# Patient Record
Sex: MALE | Race: WHITE | NOT HISPANIC OR LATINO | Employment: OTHER | URBAN - NONMETROPOLITAN AREA
[De-identification: names, ages, dates, MRNs, and addresses within clinical notes are randomized per-mention and may not be internally consistent; named-entity substitution may affect disease eponyms.]

---

## 2019-11-16 ENCOUNTER — HOSPITAL ENCOUNTER (EMERGENCY)
Facility: HOSPITAL | Age: 79
Discharge: HOME | End: 2019-11-16
Attending: EMERGENCY MEDICINE | Admitting: EMERGENCY MEDICINE

## 2019-11-16 ENCOUNTER — APPOINTMENT (OUTPATIENT)
Dept: GENERAL RADIOLOGY | Facility: HOSPITAL | Age: 79
End: 2019-11-16

## 2019-11-16 VITALS
HEIGHT: 72 IN | HEART RATE: 88 BPM | RESPIRATION RATE: 24 BRPM | OXYGEN SATURATION: 95 % | DIASTOLIC BLOOD PRESSURE: 110 MMHG | WEIGHT: 192 LBS | BODY MASS INDEX: 26.01 KG/M2 | SYSTOLIC BLOOD PRESSURE: 178 MMHG | TEMPERATURE: 98 F

## 2019-11-16 DIAGNOSIS — I10 ESSENTIAL HYPERTENSION: ICD-10-CM

## 2019-11-16 DIAGNOSIS — S80.11XA CONTUSION OF MULTIPLE SITES OF RIGHT LOWER EXTREMITY, INITIAL ENCOUNTER: Primary | ICD-10-CM

## 2019-11-16 DIAGNOSIS — I69.359 CVA, OLD, HEMIPARESIS (HCC): ICD-10-CM

## 2019-11-16 PROCEDURE — 99284 EMERGENCY DEPT VISIT MOD MDM: CPT

## 2019-11-16 PROCEDURE — 73552 X-RAY EXAM OF FEMUR 2/>: CPT

## 2019-11-16 PROCEDURE — 73590 X-RAY EXAM OF LOWER LEG: CPT

## 2019-11-16 PROCEDURE — 72170 X-RAY EXAM OF PELVIS: CPT

## 2019-11-16 RX ORDER — CLONIDINE HYDROCHLORIDE 0.1 MG/1
0.2 TABLET ORAL ONCE
Status: COMPLETED | OUTPATIENT
Start: 2019-11-16 | End: 2019-11-16

## 2019-11-16 RX ORDER — HYDROCODONE BITARTRATE AND ACETAMINOPHEN 5; 325 MG/1; MG/1
1 TABLET ORAL EVERY 6 HOURS PRN
Qty: 10 TABLET | Refills: 0 | Status: SHIPPED | OUTPATIENT
Start: 2019-11-16

## 2019-11-16 RX ADMIN — CLONIDINE HYDROCHLORIDE 0.2 MG: 0.1 TABLET ORAL at 10:09

## 2019-11-16 NOTE — ED PROVIDER NOTES
Subjective   Patient says he was trying to move out of his wheelchair yesterday and slipped and fell and hurt his right leg.  Kind of folded up underneath him.  He has had a stroke in the past and is unable to use his right side.  The pain is continued today more than he thinks that should have compared to previous falls so he wanted to get checked out.        History provided by:  Patient   used: No    Fall   Mechanism of injury: fall    Injury location:  Leg  Leg injury location:  R upper leg and R lower leg  Incident location:  Home  Time since incident:  1 day  Arrived directly from scene: no    Fall:     Fall occurred:  From a stool    Impact surface:  Hard floor    Point of impact:  Unable to specify    Entrapped after fall: no    Protective equipment: none    Suspicion of alcohol use: no    Suspicion of drug use: no    Tetanus status:  Unknown  Prior to arrival data:     Bystander interventions:  None    Patient ambulatory at scene: no      Blood loss:  None    Responsiveness at scene:  Alert    Orientation at scene:  Person, place, situation and time    Loss of consciousness: no      Amnesic to event: no      Airway interventions:  None    Breathing interventions:  None    IV access status:  None    IO access:  None    Fluids administered:  None    Cardiac interventions:  None    Medications administered:  None    Immobilization:  None    Airway condition since incident:  Stable    Breathing condition since incident:  Stable    Circulation condition since incident:  Stable    Mental status condition since incident:  Stable    Disability condition since incident:  Stable  Associated symptoms: no abdominal pain, no back pain, no blindness, no difficulty breathing, no hearing loss, no nausea and no seizures    Risk factors: no AICD, no anticoagulation therapy, no asthma, no CABG, no dialysis, no hemophilia, no kidney disease, no pacemaker, no past MI and not pregnant        Review of Systems    Constitutional: Negative.    HENT: Negative.  Negative for hearing loss.    Eyes: Negative for blindness.   Respiratory: Negative.    Cardiovascular: Negative.    Gastrointestinal: Negative.  Negative for abdominal pain and nausea.   Genitourinary: Negative.    Musculoskeletal: Negative.  Negative for back pain.   Neurological: Negative.  Negative for seizures.   Psychiatric/Behavioral: Negative.    All other systems reviewed and are negative.      No past medical history on file.    No Known Allergies    No past surgical history on file.    No family history on file.    Social History     Socioeconomic History   • Marital status: Single     Spouse name: Not on file   • Number of children: Not on file   • Years of education: Not on file   • Highest education level: Not on file       Prior to Admission medications    Not on File       Medications   cloNIDine (CATAPRES) tablet 0.2 mg (0.2 mg Oral Given 11/16/19 1009)       Vitals:    11/16/19 1009   BP:    Pulse: 79   Resp:    Temp:    SpO2:          Objective   Physical Exam   Constitutional: He is oriented to person, place, and time. He appears well-developed and well-nourished.   HENT:   Head: Normocephalic and atraumatic.   Neck: Normal range of motion. Neck supple.   Musculoskeletal:   Right arm is contracted and non-usable from a previous stroke.  He does not have any use in his right leg but does have some tenderness to palpation in the right mid thigh area and in the right anterior shin area.  There is no bony deformity noted.  There is no obvious limitation in terms of range of motion passively.   Neurological: He is alert and oriented to person, place, and time.   Skin: Skin is warm and dry.   Psychiatric: He has a normal mood and affect. His behavior is normal.   Nursing note and vitals reviewed.      Procedures         Lab Results (last 24 hours)     ** No results found for the last 24 hours. **          XR Femur 2 View Right   Final Result   No acute  bony abnormality.       This report was finalized on 11/16/2019 10:05 by Dr. Adis Escudero MD.      XR Pelvis 1 or 2 View   Final Result   1. No evidence of acute pelvic fracture.   2. Over coverage of the femoral heads by the acetabulum. Bony prominence   at the femoral head/neck junction. These findings can be seen in   patients with femoroacetabular impingement anatomy.           This report was finalized on 11/16/2019 10:04 by Dr. Adis Escudero MD.      XR Tibia Fibula 2 View Right   Final Result   No acute bony abnormality.       This report was finalized on 11/16/2019 10:03 by Dr. Adis Escudero MD.          ED Course  ED Course as of Nov 16 1041   Sat Nov 16, 2019   1037 I told the patient that his x-ray okay with no signs of anything broken.  He admits to not taking his blood pressure medication but says he will do so in the future.  He has family asked about some help getting him some home health or other rehab.  His stroke has been long-standing and has had rehab for that in the past but now he cannot name but well because the soreness in his leg.  I have told her that he has to get into a regular family physician and they can be arranged through PATS transfers in order to get to them to get all the other treatments going.  [TR]      ED Course User Index  [TR] Fausto Ness Jr., MD          MDM  Number of Diagnoses or Management Options  Contusion of multiple sites of right lower extremity, initial encounter: new and requires workup  CVA, old, hemiparesis (CMS/HCC): established and worsening  Essential hypertension: established and worsening     Amount and/or Complexity of Data Reviewed  Tests in the radiology section of CPT®: ordered and reviewed    Risk of Complications, Morbidity, and/or Mortality  Presenting problems: moderate  Diagnostic procedures: moderate  Management options: moderate    Patient Progress  Patient progress: stable      Final diagnoses:   Contusion of multiple sites of right  lower extremity, initial encounter   Essential hypertension   CVA, old, hemiparesis (CMS/HCC)          Fausto Ness Jr., MD  11/16/19 1941

## 2019-11-19 ENCOUNTER — OFFICE VISIT (OUTPATIENT)
Dept: INTERNAL MEDICINE | Age: 79
End: 2019-11-19
Payer: MEDICARE

## 2019-11-19 ENCOUNTER — APPOINTMENT (OUTPATIENT)
Dept: CT IMAGING | Age: 79
DRG: 064 | End: 2019-11-19
Payer: MEDICARE

## 2019-11-19 ENCOUNTER — APPOINTMENT (OUTPATIENT)
Dept: GENERAL RADIOLOGY | Age: 79
DRG: 064 | End: 2019-11-19
Payer: MEDICARE

## 2019-11-19 ENCOUNTER — HOSPITAL ENCOUNTER (INPATIENT)
Age: 79
LOS: 3 days | Discharge: NURSING, REHAB OR LONG-TERM CARE | DRG: 064 | End: 2019-11-22
Attending: EMERGENCY MEDICINE | Admitting: INTERNAL MEDICINE
Payer: MEDICARE

## 2019-11-19 VITALS — DIASTOLIC BLOOD PRESSURE: 130 MMHG | OXYGEN SATURATION: 98 % | HEART RATE: 64 BPM | SYSTOLIC BLOOD PRESSURE: 222 MMHG

## 2019-11-19 DIAGNOSIS — Z86.73 HISTORY OF CVA (CEREBROVASCULAR ACCIDENT): ICD-10-CM

## 2019-11-19 DIAGNOSIS — S72.001A CLOSED FRACTURE OF NECK OF RIGHT FEMUR, INITIAL ENCOUNTER (HCC): ICD-10-CM

## 2019-11-19 DIAGNOSIS — I10 ESSENTIAL HYPERTENSION: ICD-10-CM

## 2019-11-19 DIAGNOSIS — I63.9 CEREBROVASCULAR ACCIDENT (CVA), UNSPECIFIED MECHANISM (HCC): ICD-10-CM

## 2019-11-19 DIAGNOSIS — S72.001A CLOSED RIGHT HIP FRACTURE, INITIAL ENCOUNTER (HCC): ICD-10-CM

## 2019-11-19 DIAGNOSIS — I16.1 HYPERTENSIVE EMERGENCY: ICD-10-CM

## 2019-11-19 DIAGNOSIS — I62.9 INTRACRANIAL BLEED (HCC): Primary | ICD-10-CM

## 2019-11-19 DIAGNOSIS — I48.91 ATRIAL FIBRILLATION, UNSPECIFIED TYPE (HCC): ICD-10-CM

## 2019-11-19 DIAGNOSIS — Z91.14 NON COMPLIANCE W MEDICATION REGIMEN: ICD-10-CM
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
## 2019-11-19 PROBLEM — R47.1 DYSARTHRIA: Status: ACTIVE | Noted: 2019-11-19

## 2019-11-19 PROBLEM — I49.9 ARRHYTHMIA: Status: ACTIVE | Noted: 2019-11-19

## 2019-11-19 LAB
ALBUMIN SERPL-MCNC: 4 G/DL (ref 3.5–5.2)
ALP BLD-CCNC: 105 U/L (ref 40–130)
ALT SERPL-CCNC: 25 U/L (ref 5–41)
ANION GAP SERPL CALCULATED.3IONS-SCNC: 14 MMOL/L (ref 7–19)
APTT: 31.2 SEC (ref 26–36.2)
AST SERPL-CCNC: 24 U/L (ref 5–40)
BASOPHILS ABSOLUTE: 0 K/UL (ref 0–0.2)
BASOPHILS RELATIVE PERCENT: 0.6 % (ref 0–1)
BILIRUB SERPL-MCNC: 0.6 MG/DL (ref 0.2–1.2)
BUN BLDV-MCNC: 11 MG/DL (ref 8–23)
CALCIUM SERPL-MCNC: 9.4 MG/DL (ref 8.8–10.2)
CHLORIDE BLD-SCNC: 99 MMOL/L (ref 98–111)
CO2: 21 MMOL/L (ref 22–29)
CREAT SERPL-MCNC: 0.7 MG/DL (ref 0.5–1.2)
D DIMER: 3.73 UG/ML FEU (ref 0–0.48)
EOSINOPHILS ABSOLUTE: 0.1 K/UL (ref 0–0.6)
EOSINOPHILS RELATIVE PERCENT: 1.7 % (ref 0–5)
GFR NON-AFRICAN AMERICAN: >60
GLUCOSE BLD-MCNC: 102 MG/DL (ref 74–109)
HCT VFR BLD CALC: 46.8 % (ref 42–52)
HEMOGLOBIN: 15.6 G/DL (ref 14–18)
IMMATURE GRANULOCYTES #: 0 K/UL
INR BLD: 1.05 (ref 0.88–1.18)
LIPASE: 29 U/L (ref 13–60)
LYMPHOCYTES ABSOLUTE: 1.7 K/UL (ref 1.1–4.5)
LYMPHOCYTES RELATIVE PERCENT: 24 % (ref 20–40)
MCH RBC QN AUTO: 31.1 PG (ref 27–31)
MCHC RBC AUTO-ENTMCNC: 33.3 G/DL (ref 33–37)
MCV RBC AUTO: 93.4 FL (ref 80–94)
MONOCYTES ABSOLUTE: 0.6 K/UL (ref 0–0.9)
MONOCYTES RELATIVE PERCENT: 8.3 % (ref 0–10)
NEUTROPHILS ABSOLUTE: 4.5 K/UL (ref 1.5–7.5)
NEUTROPHILS RELATIVE PERCENT: 65.1 % (ref 50–65)
PDW BLD-RTO: 13.3 % (ref 11.5–14.5)
PLATELET # BLD: 207 K/UL (ref 130–400)
PMV BLD AUTO: 9.8 FL (ref 9.4–12.4)
POTASSIUM REFLEX MAGNESIUM: 4.2 MMOL/L (ref 3.5–5)
PRO-BNP: 3651 PG/ML (ref 0–1800)
PROTHROMBIN TIME: 13.1 SEC (ref 12–14.6)
RBC # BLD: 5.01 M/UL (ref 4.7–6.1)
SODIUM BLD-SCNC: 134 MMOL/L (ref 136–145)
TOTAL PROTEIN: 7.8 G/DL (ref 6.6–8.7)
TROPONIN: <0.01 NG/ML (ref 0–0.03)
WBC # BLD: 6.9 K/UL (ref 4.8–10.8)

## 2019-11-19 PROCEDURE — 70450 CT HEAD/BRAIN W/O DYE: CPT

## 2019-11-19 PROCEDURE — 2500000003 HC RX 250 WO HCPCS: Performed by: INTERNAL MEDICINE

## 2019-11-19 PROCEDURE — 93005 ELECTROCARDIOGRAM TRACING: CPT | Performed by: NURSE PRACTITIONER

## 2019-11-19 PROCEDURE — 83880 ASSAY OF NATRIURETIC PEPTIDE: CPT

## 2019-11-19 PROCEDURE — 85025 COMPLETE CBC W/AUTO DIFF WBC: CPT

## 2019-11-19 PROCEDURE — 2580000003 HC RX 258: Performed by: INTERNAL MEDICINE

## 2019-11-19 PROCEDURE — 4040F PNEUMOC VAC/ADMIN/RCVD: CPT | Performed by: INTERNAL MEDICINE

## 2019-11-19 PROCEDURE — 87081 CULTURE SCREEN ONLY: CPT

## 2019-11-19 PROCEDURE — 83690 ASSAY OF LIPASE: CPT

## 2019-11-19 PROCEDURE — 71275 CT ANGIOGRAPHY CHEST: CPT

## 2019-11-19 PROCEDURE — 6360000004 HC RX CONTRAST MEDICATION: Performed by: EMERGENCY MEDICINE

## 2019-11-19 PROCEDURE — 99285 EMERGENCY DEPT VISIT HI MDM: CPT

## 2019-11-19 PROCEDURE — 85379 FIBRIN DEGRADATION QUANT: CPT

## 2019-11-19 PROCEDURE — G8599 NO ASA/ANTIPLAT THER USE RNG: HCPCS | Performed by: INTERNAL MEDICINE

## 2019-11-19 PROCEDURE — 2000000000 HC ICU R&B

## 2019-11-19 PROCEDURE — 36415 COLL VENOUS BLD VENIPUNCTURE: CPT

## 2019-11-19 PROCEDURE — 1123F ACP DISCUSS/DSCN MKR DOCD: CPT | Performed by: INTERNAL MEDICINE

## 2019-11-19 PROCEDURE — 2580000003 HC RX 258: Performed by: NURSE PRACTITIONER

## 2019-11-19 PROCEDURE — 2500000003 HC RX 250 WO HCPCS: Performed by: NURSE PRACTITIONER

## 2019-11-19 PROCEDURE — G8421 BMI NOT CALCULATED: HCPCS | Performed by: INTERNAL MEDICINE

## 2019-11-19 PROCEDURE — 73700 CT LOWER EXTREMITY W/O DYE: CPT

## 2019-11-19 PROCEDURE — 99203 OFFICE O/P NEW LOW 30 MIN: CPT | Performed by: INTERNAL MEDICINE

## 2019-11-19 PROCEDURE — 93005 ELECTROCARDIOGRAM TRACING: CPT | Performed by: INTERNAL MEDICINE

## 2019-11-19 PROCEDURE — G8484 FLU IMMUNIZE NO ADMIN: HCPCS | Performed by: INTERNAL MEDICINE

## 2019-11-19 PROCEDURE — 73502 X-RAY EXAM HIP UNI 2-3 VIEWS: CPT

## 2019-11-19 PROCEDURE — 85610 PROTHROMBIN TIME: CPT

## 2019-11-19 PROCEDURE — 85730 THROMBOPLASTIN TIME PARTIAL: CPT

## 2019-11-19 PROCEDURE — G8427 DOCREV CUR MEDS BY ELIG CLIN: HCPCS | Performed by: INTERNAL MEDICINE

## 2019-11-19 PROCEDURE — 84484 ASSAY OF TROPONIN QUANT: CPT

## 2019-11-19 PROCEDURE — 1036F TOBACCO NON-USER: CPT | Performed by: INTERNAL MEDICINE

## 2019-11-19 PROCEDURE — 71045 X-RAY EXAM CHEST 1 VIEW: CPT

## 2019-11-19 PROCEDURE — 80053 COMPREHEN METABOLIC PANEL: CPT

## 2019-11-19 RX ORDER — SODIUM CHLORIDE 0.9 % (FLUSH) 0.9 %
10 SYRINGE (ML) INJECTION EVERY 12 HOURS SCHEDULED
Status: DISCONTINUED | OUTPATIENT
Start: 2019-11-19 | End: 2019-11-22 | Stop reason: HOSPADM

## 2019-11-19 RX ORDER — SODIUM CHLORIDE 9 MG/ML
25 INJECTION, SOLUTION INTRAVENOUS PRN
Status: DISCONTINUED | OUTPATIENT
Start: 2019-11-19 | End: 2019-11-22 | Stop reason: HOSPADM

## 2019-11-19 RX ORDER — TRAMADOL HYDROCHLORIDE 50 MG/1
50 TABLET ORAL EVERY 6 HOURS PRN
Status: DISCONTINUED | OUTPATIENT
Start: 2019-11-19 | End: 2019-11-22 | Stop reason: HOSPADM

## 2019-11-19 RX ORDER — SODIUM CHLORIDE 0.9 % (FLUSH) 0.9 %
10 SYRINGE (ML) INJECTION PRN
Status: DISCONTINUED | OUTPATIENT
Start: 2019-11-19 | End: 2019-11-22 | Stop reason: HOSPADM

## 2019-11-19 RX ORDER — ONDANSETRON 2 MG/ML
4 INJECTION INTRAMUSCULAR; INTRAVENOUS EVERY 6 HOURS PRN
Status: DISCONTINUED | OUTPATIENT
Start: 2019-11-19 | End: 2019-11-22 | Stop reason: HOSPADM

## 2019-11-19 RX ADMIN — IOPAMIDOL 90 ML: 755 INJECTION, SOLUTION INTRAVENOUS at 19:12

## 2019-11-19 RX ADMIN — DEXTROSE MONOHYDRATE 2 MG/HR: 50 INJECTION, SOLUTION INTRAVENOUS at 23:18

## 2019-11-19 RX ADMIN — DEXTROSE MONOHYDRATE 5 MG/HR: 50 INJECTION, SOLUTION INTRAVENOUS at 17:53

## 2019-11-19 RX ADMIN — Medication 10 ML: at 20:53

## 2019-11-19 ASSESSMENT — PATIENT HEALTH QUESTIONNAIRE - PHQ9
1. LITTLE INTEREST OR PLEASURE IN DOING THINGS: 0
SUM OF ALL RESPONSES TO PHQ QUESTIONS 1-9: 0
SUM OF ALL RESPONSES TO PHQ QUESTIONS 1-9: 0
SUM OF ALL RESPONSES TO PHQ9 QUESTIONS 1 & 2: 0
2. FEELING DOWN, DEPRESSED OR HOPELESS: 0

## 2019-11-19 ASSESSMENT — PAIN SCALES - GENERAL: PAINLEVEL_OUTOF10: 0

## 2019-11-20 ENCOUNTER — APPOINTMENT (OUTPATIENT)
Dept: CT IMAGING | Age: 79
DRG: 064 | End: 2019-11-20
Payer: MEDICARE

## 2019-11-20 PROBLEM — Z51.5 PALLIATIVE CARE PATIENT: Status: ACTIVE | Noted: 2019-11-20

## 2019-11-20 LAB
ANION GAP SERPL CALCULATED.3IONS-SCNC: 13 MMOL/L (ref 7–19)
BASOPHILS ABSOLUTE: 0.1 K/UL (ref 0–0.2)
BASOPHILS RELATIVE PERCENT: 0.8 % (ref 0–1)
BUN BLDV-MCNC: 10 MG/DL (ref 8–23)
CALCIUM SERPL-MCNC: 9.4 MG/DL (ref 8.8–10.2)
CHLORIDE BLD-SCNC: 99 MMOL/L (ref 98–111)
CO2: 24 MMOL/L (ref 22–29)
CREAT SERPL-MCNC: 0.8 MG/DL (ref 0.5–1.2)
EKG P AXIS: -45 DEGREES
EKG P AXIS: NORMAL DEGREES
EKG P-R INTERVAL: 204 MS
EKG P-R INTERVAL: NORMAL MS
EKG Q-T INTERVAL: 360 MS
EKG Q-T INTERVAL: 376 MS
EKG QRS DURATION: 110 MS
EKG QRS DURATION: 114 MS
EKG QTC CALCULATION (BAZETT): 417 MS
EKG QTC CALCULATION (BAZETT): 428 MS
EKG T AXIS: -122 DEGREES
EKG T AXIS: -85 DEGREES
EOSINOPHILS ABSOLUTE: 0.1 K/UL (ref 0–0.6)
EOSINOPHILS RELATIVE PERCENT: 1.4 % (ref 0–5)
GFR NON-AFRICAN AMERICAN: >60
GLUCOSE BLD-MCNC: 116 MG/DL (ref 74–109)
HCT VFR BLD CALC: 46 % (ref 42–52)
HEMOGLOBIN: 15.3 G/DL (ref 14–18)
IMMATURE GRANULOCYTES #: 0 K/UL
LYMPHOCYTES ABSOLUTE: 1.6 K/UL (ref 1.1–4.5)
LYMPHOCYTES RELATIVE PERCENT: 24.8 % (ref 20–40)
MCH RBC QN AUTO: 30.9 PG (ref 27–31)
MCHC RBC AUTO-ENTMCNC: 33.3 G/DL (ref 33–37)
MCV RBC AUTO: 92.9 FL (ref 80–94)
MONOCYTES ABSOLUTE: 0.6 K/UL (ref 0–0.9)
MONOCYTES RELATIVE PERCENT: 9.4 % (ref 0–10)
NEUTROPHILS ABSOLUTE: 4 K/UL (ref 1.5–7.5)
NEUTROPHILS RELATIVE PERCENT: 63.3 % (ref 50–65)
PDW BLD-RTO: 13.4 % (ref 11.5–14.5)
PLATELET # BLD: 205 K/UL (ref 130–400)
PMV BLD AUTO: 9.7 FL (ref 9.4–12.4)
POTASSIUM REFLEX MAGNESIUM: 3.7 MMOL/L (ref 3.5–5)
RBC # BLD: 4.95 M/UL (ref 4.7–6.1)
SODIUM BLD-SCNC: 136 MMOL/L (ref 136–145)
WBC # BLD: 6.3 K/UL (ref 4.8–10.8)

## 2019-11-20 PROCEDURE — 6370000000 HC RX 637 (ALT 250 FOR IP): Performed by: INTERNAL MEDICINE

## 2019-11-20 PROCEDURE — 93010 ELECTROCARDIOGRAM REPORT: CPT | Performed by: INTERNAL MEDICINE

## 2019-11-20 PROCEDURE — 2580000003 HC RX 258: Performed by: INTERNAL MEDICINE

## 2019-11-20 PROCEDURE — 1210000000 HC MED SURG R&B

## 2019-11-20 PROCEDURE — 6370000000 HC RX 637 (ALT 250 FOR IP): Performed by: HOSPITALIST

## 2019-11-20 PROCEDURE — 80048 BASIC METABOLIC PNL TOTAL CA: CPT

## 2019-11-20 PROCEDURE — 70450 CT HEAD/BRAIN W/O DYE: CPT

## 2019-11-20 PROCEDURE — 85025 COMPLETE CBC W/AUTO DIFF WBC: CPT

## 2019-11-20 PROCEDURE — 36415 COLL VENOUS BLD VENIPUNCTURE: CPT

## 2019-11-20 PROCEDURE — 99221 1ST HOSP IP/OBS SF/LOW 40: CPT | Performed by: NEUROLOGICAL SURGERY

## 2019-11-20 PROCEDURE — 92610 EVALUATE SWALLOWING FUNCTION: CPT

## 2019-11-20 RX ORDER — CARVEDILOL 3.12 MG/1
3.12 TABLET ORAL ONCE
Status: COMPLETED | OUTPATIENT
Start: 2019-11-20 | End: 2019-11-20

## 2019-11-20 RX ORDER — UREA 10 %
1 LOTION (ML) TOPICAL NIGHTLY PRN
Status: DISCONTINUED | OUTPATIENT
Start: 2019-11-20 | End: 2019-11-22 | Stop reason: HOSPADM

## 2019-11-20 RX ORDER — CLONIDINE HYDROCHLORIDE 0.1 MG/1
0.1 TABLET ORAL EVERY 4 HOURS PRN
Status: DISCONTINUED | OUTPATIENT
Start: 2019-11-20 | End: 2019-11-22 | Stop reason: HOSPADM

## 2019-11-20 RX ORDER — CARVEDILOL 6.25 MG/1
6.25 TABLET ORAL 2 TIMES DAILY WITH MEALS
Status: DISCONTINUED | OUTPATIENT
Start: 2019-11-20 | End: 2019-11-22 | Stop reason: HOSPADM

## 2019-11-20 RX ADMIN — CLONIDINE HYDROCHLORIDE 0.1 MG: 0.1 TABLET ORAL at 18:02

## 2019-11-20 RX ADMIN — TRAMADOL HYDROCHLORIDE 50 MG: 50 TABLET, FILM COATED ORAL at 01:07

## 2019-11-20 RX ADMIN — CARVEDILOL 6.25 MG: 6.25 TABLET, FILM COATED ORAL at 16:28

## 2019-11-20 RX ADMIN — Medication 1 MG: at 23:14

## 2019-11-20 RX ADMIN — CARVEDILOL 3.12 MG: 3.12 TABLET, FILM COATED ORAL at 13:08

## 2019-11-20 RX ADMIN — CLONIDINE HYDROCHLORIDE 0.1 MG: 0.1 TABLET ORAL at 23:26

## 2019-11-20 RX ADMIN — Medication 10 ML: at 10:08

## 2019-11-20 RX ADMIN — TRAMADOL HYDROCHLORIDE 50 MG: 50 TABLET, FILM COATED ORAL at 23:26

## 2019-11-20 RX ADMIN — CARVEDILOL 6.25 MG: 6.25 TABLET, FILM COATED ORAL at 10:44

## 2019-11-20 ASSESSMENT — PAIN DESCRIPTION - LOCATION: LOCATION: HIP

## 2019-11-20 ASSESSMENT — PAIN SCALES - GENERAL
PAINLEVEL_OUTOF10: 5
PAINLEVEL_OUTOF10: 10
PAINLEVEL_OUTOF10: 0
PAINLEVEL_OUTOF10: 5
PAINLEVEL_OUTOF10: 0

## 2019-11-20 ASSESSMENT — PAIN DESCRIPTION - ORIENTATION: ORIENTATION: RIGHT

## 2019-11-20 ASSESSMENT — PAIN DESCRIPTION - PAIN TYPE: TYPE: ACUTE PAIN

## 2019-11-20 ASSESSMENT — ENCOUNTER SYMPTOMS
EYES NEGATIVE: 1
RESPIRATORY NEGATIVE: 1
GASTROINTESTINAL NEGATIVE: 1

## 2019-11-20 ASSESSMENT — PAIN DESCRIPTION - ONSET: ONSET: GRADUAL

## 2019-11-20 ASSESSMENT — PAIN DESCRIPTION - DESCRIPTORS: DESCRIPTORS: SHARP;ACHING

## 2019-11-20 ASSESSMENT — PAIN DESCRIPTION - FREQUENCY: FREQUENCY: CONTINUOUS

## 2019-11-21 LAB — MRSA CULTURE ONLY: NORMAL

## 2019-11-21 PROCEDURE — 2580000003 HC RX 258: Performed by: INTERNAL MEDICINE

## 2019-11-21 PROCEDURE — 99232 SBSQ HOSP IP/OBS MODERATE 35: CPT | Performed by: NEUROLOGICAL SURGERY

## 2019-11-21 PROCEDURE — 6370000000 HC RX 637 (ALT 250 FOR IP): Performed by: HOSPITALIST

## 2019-11-21 PROCEDURE — 97530 THERAPEUTIC ACTIVITIES: CPT

## 2019-11-21 PROCEDURE — 1210000000 HC MED SURG R&B

## 2019-11-21 PROCEDURE — 97162 PT EVAL MOD COMPLEX 30 MIN: CPT

## 2019-11-21 RX ADMIN — CLONIDINE HYDROCHLORIDE 0.1 MG: 0.1 TABLET ORAL at 14:44

## 2019-11-21 RX ADMIN — CARVEDILOL 6.25 MG: 6.25 TABLET, FILM COATED ORAL at 16:45

## 2019-11-21 RX ADMIN — Medication 10 ML: at 09:34

## 2019-11-21 RX ADMIN — CARVEDILOL 6.25 MG: 6.25 TABLET, FILM COATED ORAL at 09:32

## 2019-11-21 ASSESSMENT — PAIN DESCRIPTION - LOCATION: LOCATION: LEG

## 2019-11-21 ASSESSMENT — PAIN SCALES - GENERAL: PAINLEVEL_OUTOF10: 2

## 2019-11-21 ASSESSMENT — PAIN DESCRIPTION - ORIENTATION: ORIENTATION: RIGHT

## 2019-11-21 ASSESSMENT — PAIN DESCRIPTION - FREQUENCY: FREQUENCY: INTERMITTENT

## 2019-11-21 ASSESSMENT — PAIN DESCRIPTION - DESCRIPTORS: DESCRIPTORS: ACHING

## 2019-11-21 ASSESSMENT — PAIN DESCRIPTION - PROGRESSION: CLINICAL_PROGRESSION: NOT CHANGED

## 2019-11-21 ASSESSMENT — PAIN DESCRIPTION - PAIN TYPE: TYPE: ACUTE PAIN

## 2019-11-21 ASSESSMENT — PAIN DESCRIPTION - ONSET: ONSET: GRADUAL

## 2019-11-22 VITALS
RESPIRATION RATE: 16 BRPM | HEART RATE: 62 BPM | HEIGHT: 73 IN | WEIGHT: 179.6 LBS | BODY MASS INDEX: 23.8 KG/M2 | TEMPERATURE: 98 F | SYSTOLIC BLOOD PRESSURE: 141 MMHG | OXYGEN SATURATION: 95 % | DIASTOLIC BLOOD PRESSURE: 83 MMHG

## 2019-11-22 PROCEDURE — 6370000000 HC RX 637 (ALT 250 FOR IP): Performed by: HOSPITALIST

## 2019-11-22 PROCEDURE — 92526 ORAL FUNCTION THERAPY: CPT

## 2019-11-22 PROCEDURE — 2580000003 HC RX 258: Performed by: INTERNAL MEDICINE

## 2019-11-22 RX ORDER — CARVEDILOL 6.25 MG/1
6.25 TABLET ORAL 2 TIMES DAILY WITH MEALS
Qty: 60 TABLET | Refills: 0
Start: 2019-11-22 | End: 2020-01-15

## 2019-11-22 RX ORDER — CLONIDINE HYDROCHLORIDE 0.1 MG/1
0.1 TABLET ORAL EVERY 4 HOURS PRN
Qty: 60 TABLET | Refills: 0
Start: 2019-11-22 | End: 2020-08-24 | Stop reason: CLARIF

## 2019-11-22 RX ORDER — TRAMADOL HYDROCHLORIDE 50 MG/1
50 TABLET ORAL EVERY 6 HOURS PRN
Qty: 10 TABLET | Refills: 0 | Status: SHIPPED | OUTPATIENT
Start: 2019-11-22 | End: 2019-11-25

## 2019-11-22 RX ADMIN — CLONIDINE HYDROCHLORIDE 0.1 MG: 0.1 TABLET ORAL at 00:13

## 2019-11-22 RX ADMIN — Medication 10 ML: at 09:30

## 2019-11-22 RX ADMIN — CARVEDILOL 6.25 MG: 6.25 TABLET, FILM COATED ORAL at 09:30

## 2019-11-22 ASSESSMENT — PAIN SCALES - GENERAL
PAINLEVEL_OUTOF10: 0
PAINLEVEL_OUTOF10: 2

## 2019-11-22 ASSESSMENT — PAIN DESCRIPTION - ORIENTATION: ORIENTATION: RIGHT

## 2019-11-22 ASSESSMENT — PAIN DESCRIPTION - DESCRIPTORS: DESCRIPTORS: ACHING

## 2019-11-22 ASSESSMENT — PAIN DESCRIPTION - PAIN TYPE
TYPE: ACUTE PAIN
TYPE: ACUTE PAIN

## 2019-11-22 ASSESSMENT — PAIN DESCRIPTION - FREQUENCY: FREQUENCY: INTERMITTENT

## 2019-11-22 ASSESSMENT — PAIN DESCRIPTION - ONSET: ONSET: GRADUAL

## 2019-11-22 ASSESSMENT — PAIN DESCRIPTION - LOCATION: LOCATION: LEG

## 2019-11-22 ASSESSMENT — PAIN DESCRIPTION - PROGRESSION: CLINICAL_PROGRESSION: NOT CHANGED

## 2019-11-25 ENCOUNTER — TELEPHONE (OUTPATIENT)
Dept: INTERNAL MEDICINE | Age: 79
End: 2019-11-25

## 2019-11-26 ENCOUNTER — LAB REQUISITION (OUTPATIENT)
Dept: LAB | Facility: HOSPITAL | Age: 79
End: 2019-11-26

## 2019-11-26 DIAGNOSIS — Z00.00 ENCOUNTER FOR GENERAL ADULT MEDICAL EXAMINATION WITHOUT ABNORMAL FINDINGS: ICD-10-CM

## 2019-11-26 LAB
ALBUMIN SERPL-MCNC: 3.6 G/DL (ref 3.5–5.2)
ALP SERPL-CCNC: 124 U/L (ref 39–117)
ALT SERPL W P-5'-P-CCNC: 10 U/L (ref 1–41)
ANION GAP SERPL CALCULATED.3IONS-SCNC: 12 MMOL/L (ref 5–15)
AST SERPL-CCNC: 14 U/L (ref 1–40)
BASOPHILS # BLD AUTO: 0.04 10*3/MM3 (ref 0–0.2)
BASOPHILS NFR BLD AUTO: 0.7 % (ref 0–1.5)
BILIRUB CONJ SERPL-MCNC: 0.2 MG/DL (ref 0.2–0.3)
BILIRUB INDIRECT SERPL-MCNC: 0.3 MG/DL
BILIRUB SERPL-MCNC: 0.5 MG/DL (ref 0.2–1.2)
BUN BLD-MCNC: 14 MG/DL (ref 8–23)
BUN/CREAT SERPL: 18.2 (ref 7–25)
CALCIUM SPEC-SCNC: 9.1 MG/DL (ref 8.6–10.5)
CHLORIDE SERPL-SCNC: 96 MMOL/L (ref 98–107)
CHOLEST SERPL-MCNC: 207 MG/DL (ref 0–200)
CO2 SERPL-SCNC: 24 MMOL/L (ref 22–29)
CREAT BLD-MCNC: 0.77 MG/DL (ref 0.76–1.27)
DEPRECATED RDW RBC AUTO: 45.3 FL (ref 37–54)
EOSINOPHIL # BLD AUTO: 0.08 10*3/MM3 (ref 0–0.4)
EOSINOPHIL NFR BLD AUTO: 1.4 % (ref 0.3–6.2)
ERYTHROCYTE [DISTWIDTH] IN BLOOD BY AUTOMATED COUNT: 13.4 % (ref 12.3–15.4)
GFR SERPL CREATININE-BSD FRML MDRD: 98 ML/MIN/1.73
GLUCOSE BLD-MCNC: 102 MG/DL (ref 65–99)
HBA1C MFR BLD: 5 % (ref 4.8–5.6)
HCT VFR BLD AUTO: 39.3 % (ref 37.5–51)
HDLC SERPL-MCNC: 30 MG/DL (ref 40–60)
HGB BLD-MCNC: 13.2 G/DL (ref 13–17.7)
IMM GRANULOCYTES # BLD AUTO: 0.01 10*3/MM3 (ref 0–0.05)
IMM GRANULOCYTES NFR BLD AUTO: 0.2 % (ref 0–0.5)
LDLC SERPL CALC-MCNC: 145 MG/DL (ref 0–100)
LDLC/HDLC SERPL: 4.85 {RATIO}
LYMPHOCYTES # BLD AUTO: 1.42 10*3/MM3 (ref 0.7–3.1)
LYMPHOCYTES NFR BLD AUTO: 24.4 % (ref 19.6–45.3)
MCH RBC QN AUTO: 31.1 PG (ref 26.6–33)
MCHC RBC AUTO-ENTMCNC: 33.6 G/DL (ref 31.5–35.7)
MCV RBC AUTO: 92.7 FL (ref 79–97)
MONOCYTES # BLD AUTO: 0.51 10*3/MM3 (ref 0.1–0.9)
MONOCYTES NFR BLD AUTO: 8.7 % (ref 5–12)
NEUTROPHILS # BLD AUTO: 3.77 10*3/MM3 (ref 1.7–7)
NEUTROPHILS NFR BLD AUTO: 64.6 % (ref 42.7–76)
NRBC BLD AUTO-RTO: 0 /100 WBC (ref 0–0.2)
PLATELET # BLD AUTO: 223 10*3/MM3 (ref 140–450)
PMV BLD AUTO: 10.3 FL (ref 6–12)
POTASSIUM BLD-SCNC: 3.9 MMOL/L (ref 3.5–5.2)
PREALB SERPL-MCNC: 24.6 MG/DL (ref 20–40)
PROT SERPL-MCNC: 6.8 G/DL (ref 6–8.5)
RBC # BLD AUTO: 4.24 10*6/MM3 (ref 4.14–5.8)
SODIUM BLD-SCNC: 132 MMOL/L (ref 136–145)
TRIGL SERPL-MCNC: 158 MG/DL (ref 0–150)
TSH SERPL DL<=0.05 MIU/L-ACNC: 9.74 UIU/ML (ref 0.27–4.2)
VIT B12 BLD-MCNC: <150 PG/ML (ref 211–946)
VLDLC SERPL-MCNC: 31.6 MG/DL
WBC NRBC COR # BLD: 5.83 10*3/MM3 (ref 3.4–10.8)

## 2019-11-26 PROCEDURE — 84443 ASSAY THYROID STIM HORMONE: CPT | Performed by: FAMILY MEDICINE

## 2019-11-26 PROCEDURE — 85025 COMPLETE CBC W/AUTO DIFF WBC: CPT | Performed by: FAMILY MEDICINE

## 2019-11-26 PROCEDURE — 80061 LIPID PANEL: CPT | Performed by: FAMILY MEDICINE

## 2019-11-26 PROCEDURE — 80048 BASIC METABOLIC PNL TOTAL CA: CPT | Performed by: FAMILY MEDICINE

## 2019-11-26 PROCEDURE — 84134 ASSAY OF PREALBUMIN: CPT | Performed by: FAMILY MEDICINE

## 2019-11-26 PROCEDURE — 80076 HEPATIC FUNCTION PANEL: CPT | Performed by: FAMILY MEDICINE

## 2019-11-26 PROCEDURE — 36415 COLL VENOUS BLD VENIPUNCTURE: CPT | Performed by: FAMILY MEDICINE

## 2019-11-26 PROCEDURE — 83036 HEMOGLOBIN GLYCOSYLATED A1C: CPT | Performed by: FAMILY MEDICINE

## 2019-11-26 PROCEDURE — 82607 VITAMIN B-12: CPT | Performed by: FAMILY MEDICINE

## 2019-12-06 ENCOUNTER — TELEPHONE (OUTPATIENT)
Dept: INTERNAL MEDICINE | Age: 79
End: 2019-12-06

## 2019-12-11 ENCOUNTER — LAB REQUISITION (OUTPATIENT)
Dept: LAB | Facility: HOSPITAL | Age: 79
End: 2019-12-11

## 2019-12-11 DIAGNOSIS — Z00.00 ENCOUNTER FOR GENERAL ADULT MEDICAL EXAMINATION WITHOUT ABNORMAL FINDINGS: ICD-10-CM

## 2019-12-11 LAB — TSH SERPL DL<=0.05 MIU/L-ACNC: 6.18 UIU/ML (ref 0.27–4.2)

## 2019-12-11 PROCEDURE — 36415 COLL VENOUS BLD VENIPUNCTURE: CPT | Performed by: NURSE PRACTITIONER

## 2019-12-11 PROCEDURE — 84443 ASSAY THYROID STIM HORMONE: CPT | Performed by: NURSE PRACTITIONER

## 2019-12-13 ENCOUNTER — TELEPHONE (OUTPATIENT)
Dept: INTERNAL MEDICINE | Age: 79
End: 2019-12-13

## 2019-12-23 ENCOUNTER — TELEPHONE (OUTPATIENT)
Dept: INTERNAL MEDICINE | Age: 79
End: 2019-12-23

## 2020-01-02 ENCOUNTER — TELEPHONE (OUTPATIENT)
Dept: INTERNAL MEDICINE | Age: 80
End: 2020-01-02

## 2020-01-08 ENCOUNTER — TELEPHONE (OUTPATIENT)
Dept: INTERNAL MEDICINE CLINIC | Age: 80
End: 2020-01-08

## 2020-01-09 ENCOUNTER — TELEPHONE (OUTPATIENT)
Dept: INTERNAL MEDICINE | Age: 80
End: 2020-01-09

## 2020-01-09 NOTE — TELEPHONE ENCOUNTER
Pt was due to d/c home from [place] [id number], but per daughter he was d/c today.   Will call back tomorrow or Monday for TCM follow up

## 2020-01-10 ENCOUNTER — TELEPHONE (OUTPATIENT)
Dept: INTERNAL MEDICINE | Age: 80
End: 2020-01-10

## 2020-01-10 NOTE — TELEPHONE ENCOUNTER
[facility] Transitions Initial Follow Up Call    Outreach made within 2 business days of discharge: Yes    Patient: Chelsie Means Patient : 1940   MRN: [id number]    Reason for Admission:   Principal Problem:    Intracranial bleed [facility])  Active Problems:    Essential hypertension    Hypertensive emergency    Closed right hip fracture, initial encounter [facility])    Dysarthria    History of CVA (cerebrovascular accident)    Arrhythmia    Palliative care patient  Resolved Problems:    * No resolved hospital problems. *    [facility] date:  2019          Discharge Date: 19     [facility]  Discharge Date: 2020       Spoke with: unable to reach daughter, will try again on Monday.      Discharge department/facility: [facility] and [facility]      Scheduled appointment with PCP within 7-14 days    Follow Up  Future Appointments   Date Time Provider Lillie Awad   2020  1:00 PM Jasen Alexander MD [facility]       April D Chandler Michael LPN

## 2020-01-13 ENCOUNTER — TELEPHONE (OUTPATIENT)
Dept: INTERNAL MEDICINE | Age: 80
End: 2020-01-13

## 2020-01-13 ENCOUNTER — TELEPHONE (OUTPATIENT)
Dept: INTERNAL MEDICINE CLINIC | Age: 80
End: 2020-01-13

## 2020-01-15 RX ORDER — CARVEDILOL 12.5 MG/1
TABLET ORAL
COMMUNITY
Start: 2020-01-11 | End: 2020-01-16 | Stop reason: SDUPTHER

## 2020-01-15 RX ORDER — LISINOPRIL 40 MG/1
TABLET ORAL
COMMUNITY
Start: 2020-01-11 | End: 2020-01-16 | Stop reason: SDUPTHER

## 2020-01-15 RX ORDER — HYDROCHLOROTHIAZIDE 12.5 MG/1
CAPSULE, GELATIN COATED ORAL
COMMUNITY
Start: 2020-01-11 | End: 2020-01-16 | Stop reason: ALTCHOICE

## 2020-01-15 RX ORDER — HYDRALAZINE HYDROCHLORIDE 10 MG/1
TABLET, FILM COATED ORAL
COMMUNITY
Start: 2020-01-11 | End: 2020-01-16 | Stop reason: SDUPTHER

## 2020-01-15 RX ORDER — LEVOTHYROXINE SODIUM 0.05 MG/1
TABLET ORAL
COMMUNITY
Start: 2020-01-11 | End: 2020-01-16 | Stop reason: SDUPTHER

## 2020-01-16 ENCOUNTER — OFFICE VISIT (OUTPATIENT)
Dept: INTERNAL MEDICINE | Age: 80
End: 2020-01-16
Payer: MEDICARE

## 2020-01-16 VITALS — DIASTOLIC BLOOD PRESSURE: 70 MMHG | SYSTOLIC BLOOD PRESSURE: 154 MMHG | HEART RATE: 54 BPM

## 2020-01-16 PROCEDURE — 1111F DSCHRG MED/CURRENT MED MERGE: CPT | Performed by: INTERNAL MEDICINE

## 2020-01-16 PROCEDURE — 99496 TRANSJ CARE MGMT HIGH F2F 7D: CPT | Performed by: INTERNAL MEDICINE

## 2020-01-16 RX ORDER — CARVEDILOL 12.5 MG/1
12.5 TABLET ORAL 2 TIMES DAILY
Qty: 60 TABLET | Refills: 5 | Status: SHIPPED | OUTPATIENT
Start: 2020-01-16

## 2020-01-16 RX ORDER — HYDRALAZINE HYDROCHLORIDE 10 MG/1
10 TABLET, FILM COATED ORAL 4 TIMES DAILY
Qty: 90 TABLET | Refills: 5 | Status: SHIPPED | OUTPATIENT
Start: 2020-01-16 | End: 2020-08-24 | Stop reason: CLARIF

## 2020-01-16 RX ORDER — LISINOPRIL 40 MG/1
40 TABLET ORAL DAILY
Qty: 30 TABLET | Refills: 5 | Status: SHIPPED | OUTPATIENT
Start: 2020-01-16

## 2020-01-16 RX ORDER — LEVOTHYROXINE SODIUM 0.05 MG/1
50 TABLET ORAL DAILY
Qty: 30 TABLET | Refills: 5 | Status: SHIPPED | OUTPATIENT
Start: 2020-01-16 | End: 2020-08-24 | Stop reason: CLARIF

## 2020-01-16 ASSESSMENT — ENCOUNTER SYMPTOMS
TROUBLE SWALLOWING: 0
EYE DISCHARGE: 0
SORE THROAT: 0
SINUS PRESSURE: 0
WHEEZING: 0
BLOOD IN STOOL: 0
EYE ITCHING: 0
BACK PAIN: 0
ABDOMINAL DISTENTION: 0
ABDOMINAL PAIN: 0
NAUSEA: 0
VOMITING: 0
SHORTNESS OF BREATH: 0

## 2020-01-16 NOTE — PROGRESS NOTES
Post-Discharge Transitional Care Management Services      Mayco Boudreaux   YOB: 1940    Date of Visit:  1/16/2020  30 Day Post-Discharge Date: February 9, 2020    No Known Allergies  Outpatient Medications Marked as Taking for the 1/16/20 encounter (Office Visit) with Aiden Quezada MD   Medication Sig Dispense Refill    lisinopril (PRINIVIL;ZESTRIL) 40 MG tablet Take 1 tablet by mouth daily 30 tablet 5    carvedilol (COREG) 12.5 MG tablet Take 1 tablet by mouth 2 times daily 60 tablet 5    levothyroxine (SYNTHROID) 50 MCG tablet Take 1 tablet by mouth Daily 30 tablet 5    hydrALAZINE (APRESOLINE) 10 MG tablet Take 1 tablet by mouth 4 times daily 90 tablet 5    cloNIDine (CATAPRES) 0.1 MG tablet Take 1 tablet by mouth every 4 hours as needed for High Blood Pressure (SBP >160) 60 tablet 0         Vitals:    01/16/20 1312 01/16/20 1313   BP: (!) 150/70 (!) 154/70   Pulse: 54      There is no height or weight on file to calculate BMI. Wt Readings from Last 3 Encounters:   11/20/19 179 lb 9.6 oz (81.5 kg)     BP Readings from Last 3 Encounters:   01/16/20 (!) 154/70   11/22/19 (!) 141/83   11/19/19 (!) 222/130        Patient was admitted to [facility] and [address] [place] [address] home from November 19, 2019 to January 9, 2020 for subdural hematoma and fractured hip. HPI:  Patient is here for transitional care visit. He was admitted to the hospital on 19 November with a head injury and subdural hematoma as well as a fractured hip. He discharged from the hospital on the 22nd November and went to [facility] and was there until 9 January. He has been home for a week now and has home health getting him physical therapy. Inpatient course: Discharge summary reviewed- see chart. Current status: Guarded    Review of Systems:  Review of Systems   Constitutional: Positive for fatigue. Negative for activity change, appetite change and fever.    HENT: Negative for congestion, hearing loss, inpatient labs    Patient risk of morbidity and mortality: high    Medical Decision Making: high complexity he is home now and has been for a week. I am concerned that he is not able to take care of himself and he definitely does not look like someone who needs to be left alone. He is got home health coming in 3 times a week for therapy and I do not think that is going to last long since he had reached maximal benefit in the nursing home. I am very concerned that this man is by himself. The daughter is with him today and she does not seem to be too concerned. On the basis of positive falls risk screening, assessment and plan is as follows: home safety tips provided.

## 2020-02-11 ENCOUNTER — TELEPHONE (OUTPATIENT)
Dept: INTERNAL MEDICINE CLINIC | Age: 80
End: 2020-02-11

## 2020-02-11 NOTE — TELEPHONE ENCOUNTER
[address] [address] PT vipul done and . Patient is performing transfers in home independently but requires min/mod assistance to perform vehicle transfers. Patient has also not begun ambulating with hemiwalker and states that he does not feel his LE strength has improved enough to ambulate at this time. Patient would benefit from continuing PT to work on strength and balance in order to progress to short distance ambulation with hemiwalker.   RECOMMENDATION:   skilled PT 2x/wk x 1 wk, then 3x/wk x 3 wks with planned discharge at end of visit schedule    Please advise if approved

## 2020-07-24 ENCOUNTER — TELEPHONE (OUTPATIENT)
Dept: INTERNAL MEDICINE | Age: 80
End: 2020-07-24

## 2020-07-24 NOTE — TELEPHONE ENCOUNTER
Pt fell a few days ago and does not want to go to er. Very sore and daughter ask if dr will order phys there? Told daughter needs er for xray and evaluation.

## 2020-08-17 ENCOUNTER — APPOINTMENT (OUTPATIENT)
Dept: CT IMAGING | Age: 80
End: 2020-08-17
Payer: MEDICARE

## 2020-08-17 ENCOUNTER — APPOINTMENT (OUTPATIENT)
Dept: GENERAL RADIOLOGY | Age: 80
End: 2020-08-17
Payer: MEDICARE

## 2020-08-17 ENCOUNTER — HOSPITAL ENCOUNTER (EMERGENCY)
Age: 80
Discharge: HOME | End: 2020-08-17
Attending: EMERGENCY MEDICINE
Payer: MEDICARE

## 2020-08-17 VITALS
WEIGHT: 200 LBS | HEIGHT: 73 IN | OXYGEN SATURATION: 98 % | HEART RATE: 88 BPM | RESPIRATION RATE: 22 BRPM | BODY MASS INDEX: 26.51 KG/M2 | TEMPERATURE: 98 F | SYSTOLIC BLOOD PRESSURE: 156 MMHG | DIASTOLIC BLOOD PRESSURE: 104 MMHG

## 2020-08-17 LAB
ALBUMIN SERPL-MCNC: 4 G/DL (ref 3.5–5.2)
ALP BLD-CCNC: 96 U/L (ref 40–130)
ALT SERPL-CCNC: 15 U/L (ref 5–41)
ANION GAP SERPL CALCULATED.3IONS-SCNC: 10 MMOL/L (ref 7–19)
APTT: 27.8 SEC (ref 26–36.2)
AST SERPL-CCNC: 27 U/L (ref 5–40)
BASOPHILS ABSOLUTE: 0 K/UL (ref 0–0.2)
BASOPHILS RELATIVE PERCENT: 0.3 % (ref 0–1)
BILIRUB SERPL-MCNC: 0.7 MG/DL (ref 0.2–1.2)
BUN BLDV-MCNC: 14 MG/DL (ref 8–23)
CALCIUM SERPL-MCNC: 9.2 MG/DL (ref 8.8–10.2)
CHLORIDE BLD-SCNC: 100 MMOL/L (ref 98–111)
CO2: 24 MMOL/L (ref 22–29)
CREAT SERPL-MCNC: 1 MG/DL (ref 0.5–1.2)
EOSINOPHILS ABSOLUTE: 0 K/UL (ref 0–0.6)
EOSINOPHILS RELATIVE PERCENT: 0.2 % (ref 0–5)
GFR AFRICAN AMERICAN: >59
GFR NON-AFRICAN AMERICAN: >60
GLUCOSE BLD-MCNC: 114 MG/DL (ref 74–109)
HCT VFR BLD CALC: 44.9 % (ref 42–52)
HEMOGLOBIN: 15.1 G/DL (ref 14–18)
IMMATURE GRANULOCYTES #: 0 K/UL
INR BLD: 0.98 (ref 0.88–1.18)
LYMPHOCYTES ABSOLUTE: 0.8 K/UL (ref 1.1–4.5)
LYMPHOCYTES RELATIVE PERCENT: 8.8 % (ref 20–40)
MCH RBC QN AUTO: 30.7 PG (ref 27–31)
MCHC RBC AUTO-ENTMCNC: 33.6 G/DL (ref 33–37)
MCV RBC AUTO: 91.3 FL (ref 80–94)
MONOCYTES ABSOLUTE: 0.6 K/UL (ref 0–0.9)
MONOCYTES RELATIVE PERCENT: 6.3 % (ref 0–10)
NEUTROPHILS ABSOLUTE: 7.9 K/UL (ref 1.5–7.5)
NEUTROPHILS RELATIVE PERCENT: 84.1 % (ref 50–65)
PDW BLD-RTO: 14.4 % (ref 11.5–14.5)
PLATELET # BLD: 189 K/UL (ref 130–400)
PMV BLD AUTO: 9.2 FL (ref 9.4–12.4)
POTASSIUM SERPL-SCNC: 4 MMOL/L (ref 3.5–5)
PROTHROMBIN TIME: 12.9 SEC (ref 12–14.6)
RBC # BLD: 4.92 M/UL (ref 4.7–6.1)
SODIUM BLD-SCNC: 134 MMOL/L (ref 136–145)
TOTAL PROTEIN: 7.7 G/DL (ref 6.6–8.7)
WBC # BLD: 9.4 K/UL (ref 4.8–10.8)

## 2020-08-17 PROCEDURE — 2500000003 HC RX 250 WO HCPCS: Performed by: EMERGENCY MEDICINE

## 2020-08-17 PROCEDURE — 85025 COMPLETE CBC W/AUTO DIFF WBC: CPT

## 2020-08-17 PROCEDURE — 90471 IMMUNIZATION ADMIN: CPT | Performed by: EMERGENCY MEDICINE

## 2020-08-17 PROCEDURE — 90715 TDAP VACCINE 7 YRS/> IM: CPT | Performed by: EMERGENCY MEDICINE

## 2020-08-17 PROCEDURE — 6360000002 HC RX W HCPCS: Performed by: EMERGENCY MEDICINE

## 2020-08-17 PROCEDURE — 99284 EMERGENCY DEPT VISIT MOD MDM: CPT

## 2020-08-17 PROCEDURE — 93005 ELECTROCARDIOGRAM TRACING: CPT | Performed by: EMERGENCY MEDICINE

## 2020-08-17 PROCEDURE — 85730 THROMBOPLASTIN TIME PARTIAL: CPT

## 2020-08-17 PROCEDURE — 71045 X-RAY EXAM CHEST 1 VIEW: CPT

## 2020-08-17 PROCEDURE — 80053 COMPREHEN METABOLIC PANEL: CPT

## 2020-08-17 PROCEDURE — 85610 PROTHROMBIN TIME: CPT

## 2020-08-17 PROCEDURE — 12013 RPR F/E/E/N/L/M 2.6-5.0 CM: CPT

## 2020-08-17 PROCEDURE — 72125 CT NECK SPINE W/O DYE: CPT

## 2020-08-17 PROCEDURE — 70450 CT HEAD/BRAIN W/O DYE: CPT

## 2020-08-17 PROCEDURE — 36415 COLL VENOUS BLD VENIPUNCTURE: CPT

## 2020-08-17 PROCEDURE — 70486 CT MAXILLOFACIAL W/O DYE: CPT

## 2020-08-17 RX ORDER — ACETAMINOPHEN 325 MG/1
650 TABLET ORAL ONCE
Status: DISCONTINUED | OUTPATIENT
Start: 2020-08-17 | End: 2020-08-17 | Stop reason: HOSPADM

## 2020-08-17 RX ORDER — LIDOCAINE HYDROCHLORIDE 10 MG/ML
5 INJECTION, SOLUTION EPIDURAL; INFILTRATION; INTRACAUDAL; PERINEURAL ONCE
Status: COMPLETED | OUTPATIENT
Start: 2020-08-17 | End: 2020-08-17

## 2020-08-17 RX ADMIN — LIDOCAINE HYDROCHLORIDE 5 ML: 10 INJECTION, SOLUTION EPIDURAL; INFILTRATION; INTRACAUDAL; PERINEURAL at 11:38

## 2020-08-17 RX ADMIN — TETANUS TOXOID, REDUCED DIPHTHERIA TOXOID AND ACELLULAR PERTUSSIS VACCINE, ADSORBED 0.5 ML: 5; 2.5; 8; 8; 2.5 SUSPENSION INTRAMUSCULAR at 11:39

## 2020-08-17 ASSESSMENT — ENCOUNTER SYMPTOMS
RHINORRHEA: 0
SHORTNESS OF BREATH: 0
SORE THROAT: 0
DIARRHEA: 0
ABDOMINAL PAIN: 0
COUGH: 0
VOMITING: 0
NAUSEA: 0
BACK PAIN: 0

## 2020-08-17 ASSESSMENT — PAIN SCALES - GENERAL: PAINLEVEL_OUTOF10: 9

## 2020-08-17 NOTE — ED PROVIDER NOTES
[facility] EMERGENCY DEPT  eMERGENCY dEPARTMENT eNCOUnter      Pt Name: Negrito Gordon  MRN: [id number]  [place] 1940  Date of evaluation: 8/17/2020  Provider: Daphnie Bonilla MD    [address] [place] [address]       Chief Complaint   Patient presents with    Fall     Pt to ED with c/o fall with head/face injury          HISTORY OF PRESENT ILLNESS   (Location/Symptom, Timing/Onset,Context/Setting, Quality, Duration, Modifying Factors, Severity)  Note limiting factors. Negrito Gordon is a 70 y.o. male who presents to the emergency department after a fall. The patient was laying in between the wall and the toilet after he try to go to the bathroom this morning. 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 He scoots around in his wheelchair at baseline then stands to get to bathroom. He is unsure if he tripped or exactly what occurred but does not recall feeling lightheaded or dizzy or having any syncope. He was able to push his life alert necklace which notified EMS. He is alert and oriented here. Tells me he hurts all over. Not on any anticoagulants. HPI    NursingNotes were reviewed. REVIEW OF SYSTEMS    (2-9 systems for level 4, 10 or more for level 5)     Review of Systems   Constitutional: Negative for chills and fever. HENT: Negative for rhinorrhea and sore throat. Respiratory: Negative for cough and shortness of breath. Cardiovascular: Negative for chest pain and leg swelling. Gastrointestinal: Negative for abdominal pain, diarrhea, nausea and vomiting. Genitourinary: Negative for dysuria and frequency. Musculoskeletal: Negative for back pain and neck pain. Skin: Positive for wound. Neurological: Positive for headaches. Negative for dizziness. All other systems reviewed and are negative.            PAST MEDICALHISTORY     Past Medical History:   Diagnosis Date    Cerebral artery occlusion with cerebral abused: None     Physically abused: None     Forced sexual activity: None   Other Topics Concern    None   Social History Narrative    None       SCREENINGS             PHYSICAL EXAM    (up to 7 for level 4, 8 or more for level 5)     ED Triage Vitals [08/17/20 1015]   BP Temp Temp src Pulse Resp SpO2 Height Weight   (!) 176/126 98 °F (36.7 °C) -- 98 22 96 % 6' 1\" (1.854 m) 200 lb (90.7 kg)       Physical Exam  Vitals signs and nursing note reviewed. Constitutional:       General: He is not in acute distress. Appearance: Normal appearance. He is well-developed. He is not ill-appearing, toxic-appearing or diaphoretic. HENT:      Head: Normocephalic. Contusion and laceration present. Comments: Approximately 3 cm laceration down to subcutaneous tissues no bone visualized    Some contusion overlying the bridge of the nose no septal hematoma and no epistaxis     Nose: Nose normal.      Mouth/Throat:      Mouth: Mucous membranes are moist.   Eyes:      Conjunctiva/sclera: Conjunctivae normal.   Neck:      Musculoskeletal: Normal range of motion and neck supple. Trachea: No tracheal deviation. Comments: No midline or paraspinal tenderness  Cardiovascular:      Rate and Rhythm: Normal rate and regular rhythm. Pulses: Normal pulses. Heart sounds: Normal heart sounds. No murmur. Pulmonary:      Effort: Pulmonary effort is normal.      Breath sounds: Normal breath sounds. No wheezing or rales. Abdominal:      Palpations: Abdomen is soft. There is no mass. Tenderness: There is no abdominal tenderness. Musculoskeletal: Normal range of motion. Comments: Right upper extremity and hand held in somewhat flexion due to chronic weakness    No obvious deformities or pain on palpation of extremities while ranging    No midline thoracic or lumbar back pain   Skin:     General: Skin is warm and dry. Neurological:      Mental Status: He is alert and oriented to person, place, and time. Location:  Face    Face location:  R eyebrow    Length (cm):  3  Repair type:     Repair type:  Simple  Pre-procedure details:     Preparation:  Patient was prepped and draped in usual sterile fashion and imaging obtained to evaluate for foreign bodies  Exploration:     Hemostasis achieved with:  Direct pressure    Wound extent: no foreign bodies/material noted and no underlying fracture noted      Contaminated: no    Treatment:     Area cleansed with:  Betadine    Amount of cleaning:  Standard    Irrigation solution:  Sterile saline  Skin repair:     Repair method:  Sutures    Suture size:  5-0    Suture material:  Prolene    Suture technique:  Running    Number of sutures:  1  Approximation:     Approximation:  Close  Post-procedure details:     Dressing:  Open (no dressing)    Patient tolerance of procedure: Tolerated well, no immediate complications        FINAL IMPRESSION      1. Closed head injury, initial encounter    2. Facial laceration, initial encounter    3.  Closed fracture of nasal bone, initial encounter          DISPOSITION/PLAN   DISPOSITION        PATIENT REFERRED TO:  Jose Madden MD  [place] [phone]    Schedule an appointment as soon as possible for a visit in 1 week  For suture removal    Leobardo Glass MD  [address] [place] [address]  [phone]    Schedule an appointment as soon as possible for a visit in 1 week        DISCHARGE MEDICATIONS:  New Prescriptions    No medications on file          (Please note that portions of this note were completed with a voice recognition program.  Efforts were made to edit thedictations but occasionally words are mis-transcribed.)    Andreina Desir MD (electronically signed)  Attending Emergency Physician        Mariya Wharton MD  08/17/20 0551

## 2020-08-17 NOTE — ED NOTES
Bed: 06  Expected date:   Expected time:   Means of arrival:   Comments:  EMS     Young Fuller RN  08/17/20 1014

## 2020-08-17 NOTE — CARE COORDINATION
Spoke with daughter, Ron Bryan, regarding her desire for placement. Explained that unless they were willing to self-pay, I could not get him in a facility today. I provided information on private sitters and APS contact, if needed. Asked if Medicaid might be a possibility, she stated that she would look into that.

## 2020-08-18 LAB
EKG P AXIS: 43 DEGREES
EKG P-R INTERVAL: 182 MS
EKG Q-T INTERVAL: 360 MS
EKG QRS DURATION: 112 MS
EKG QTC CALCULATION (BAZETT): 424 MS
EKG T AXIS: 116 DEGREES

## 2020-08-18 PROCEDURE — 93010 ELECTROCARDIOGRAM REPORT: CPT | Performed by: INTERNAL MEDICINE

## 2020-08-19 ENCOUNTER — TELEPHONE (OUTPATIENT)
Dept: OTOLARYNGOLOGY | Age: 80
End: 2020-08-19

## 2020-08-19 ENCOUNTER — TELEPHONE (OUTPATIENT)
Dept: INTERNAL MEDICINE | Age: 80
End: 2020-08-19

## 2020-08-19 NOTE — TELEPHONE ENCOUNTER
Called patient to schedule an appointment as requested. She did not answer. I left a message for them to call back asap. PATIENT NEEDS TO BE SEEN FIRST THING 8/20/2020. DR DOE WILL BE GONE. PATIENT CAN BE SCHEDULED WITH [facility] AND CONSULTED BY [facility].

## 2020-08-24 ENCOUNTER — OFFICE VISIT (OUTPATIENT)
Dept: INTERNAL MEDICINE | Age: 80
End: 2020-08-24
Payer: MEDICARE

## 2020-08-24 VITALS — HEART RATE: 72 BPM | SYSTOLIC BLOOD PRESSURE: 160 MMHG | DIASTOLIC BLOOD PRESSURE: 90 MMHG | OXYGEN SATURATION: 97 %

## 2020-08-24 PROBLEM — S01.81XA FACIAL LACERATION: Status: ACTIVE | Noted: 2020-08-24

## 2020-08-24 PROCEDURE — 99214 OFFICE O/P EST MOD 30 MIN: CPT | Performed by: INTERNAL MEDICINE

## 2020-08-24 PROCEDURE — 1036F TOBACCO NON-USER: CPT | Performed by: INTERNAL MEDICINE

## 2020-08-24 PROCEDURE — G8427 DOCREV CUR MEDS BY ELIG CLIN: HCPCS | Performed by: INTERNAL MEDICINE

## 2020-08-24 PROCEDURE — 4040F PNEUMOC VAC/ADMIN/RCVD: CPT | Performed by: INTERNAL MEDICINE

## 2020-08-24 PROCEDURE — G8419 CALC BMI OUT NRM PARAM NOF/U: HCPCS | Performed by: INTERNAL MEDICINE

## 2020-08-24 PROCEDURE — 1123F ACP DISCUSS/DSCN MKR DOCD: CPT | Performed by: INTERNAL MEDICINE

## 2020-08-24 ASSESSMENT — ENCOUNTER SYMPTOMS
SORE THROAT: 0
TROUBLE SWALLOWING: 0
VOMITING: 0
ABDOMINAL DISTENTION: 0
ABDOMINAL PAIN: 0
SHORTNESS OF BREATH: 0
SINUS PRESSURE: 0
EYE ITCHING: 0
WHEEZING: 0
BLOOD IN STOOL: 0
NAUSEA: 0
EYE DISCHARGE: 0
BACK PAIN: 0

## 2020-08-24 ASSESSMENT — PATIENT HEALTH QUESTIONNAIRE - PHQ9: DEPRESSION UNABLE TO ASSESS: FUNCTIONAL CAPACITY MOTIVATION LIMITS ACCURACY

## 2020-08-24 NOTE — PROGRESS NOTES
abdominal distention, abdominal pain, blood in stool, nausea and vomiting. Endocrine: Negative for cold intolerance, heat intolerance and polydipsia. Genitourinary: Negative for flank pain, frequency, hematuria and urgency. Musculoskeletal: Positive for arthralgias and myalgias. Negative for back pain and joint swelling. Skin: Negative for rash and wound. Allergic/Immunologic: Negative for environmental allergies and food allergies. Neurological: Positive for weakness and light-headedness. Negative for dizziness, tremors, syncope, numbness and headaches. Hematological: Negative for adenopathy. Psychiatric/Behavioral: Negative for agitation and hallucinations. The patient is not nervous/anxious. Objective:      BP (!) 160/90   Pulse 72   SpO2 97%    Physical Exam  Constitutional:       Appearance: He is ill-appearing. Eyes:      Extraocular Movements: Extraocular movements intact. Conjunctiva/sclera: Conjunctivae normal.      Pupils: Pupils are equal, round, and reactive to light. Neck:      Musculoskeletal: Normal range of motion. Cardiovascular:      Rate and Rhythm: Normal rate. Pulses: Normal pulses. Pulmonary:      Effort: Pulmonary effort is normal.   Skin:     Findings: Lesion present. Comments: Laceration above the right eye that scabbed over and looks reddened with the sutures buried deep within the wound   Neurological:      General: No focal deficit present. Mental Status: He is alert. Mental status is at baseline. Assessment:      Diagnosis Orders   1. Facial laceration, initial encounter              Plan:     Status post fall with a nasal fracture and a facial laceration. He was supposed to see Dr. hickman in a week which would be a today for suture removal and evaluation of the nasal fracture. Daughter brought him here instead.   The wound is not been touched since he left the ER obviously because it is infected looking it is all indurated is all scabbed over and the sutures are not even recognizable within the scab. I told her were not equipped to remove the sutures as far as the way that this wound has not been taking care of since he left the ER. We have referred him to either go see the ENT for the evaluation of that nasal fracture which he was supposed to have done as well as suture removal or go back to the ER because we are not equipped to remove them here and the way it looks I am fearful that we will get a started bleeding again if we try. He does not need to be living alone. There is no way this man can take care of himself. He wants home health and I told him that is not going to be enough that he needs round-the-clock care. He refuses. No follow-ups on file. Patient given educational materials- see patient instructions. Discussed use, benefit, and side effects of prescribedmedications. All patient questions answered. Pt voiced understanding. Reviewedhealth maintenance. Instructed to continue current medications, diet and exercise. Patient agreed with treatment plan. **This report has been created usingvoice recognition software. It may contain minor errors which are inherent in voicerecognition technology. **    Electronically signed by Amy Alvarado MD on 8/24/2020 at 1:46 PM

## 2020-08-26 ENCOUNTER — TELEPHONE (OUTPATIENT)
Dept: INTERNAL MEDICINE CLINIC | Age: 80
End: 2020-08-26

## 2020-08-26 NOTE — TELEPHONE ENCOUNTER
[facility] worker vipul done today and they plan to follow up in the following weeks after pt has some PT to see how is doing   Sw could not get in touch with the pts dgt but they are trying to get in touch with her as well for follow up     Please advise if follow up SW visits are approved as needed

## 2020-08-27 NOTE — TELEPHONE ENCOUNTER
Pt has requested to be DNR   They need the approval from your office on that as well in order to place DNR order in [facility] chart ?

## 2020-08-28 ENCOUNTER — TELEPHONE (OUTPATIENT)
Dept: INTERNAL MEDICINE CLINIC | Age: 80
End: 2020-08-28
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
## 2021-02-02 ENCOUNTER — TELEPHONE (OUTPATIENT)
Dept: INTERNAL MEDICINE | Age: 81
End: 2021-02-02

## 2022-10-25 ENCOUNTER — TELEPHONE (OUTPATIENT)
Dept: INTERNAL MEDICINE | Age: 82
End: 2022-10-25

## 2022-10-25 NOTE — TELEPHONE ENCOUNTER
Left voicemail for daughter to return call to schedule patient's Medicare annual wellness visit. Advised to call [phone].

## 2023-10-11 NOTE — ED TRIAGE NOTES
Pt to ED with c/o fall with head/face injury Pt denies LOC
Normal vision: sees adequately in most situations; can see medication labels, newsprint